# Patient Record
Sex: FEMALE | Race: OTHER | HISPANIC OR LATINO
[De-identification: names, ages, dates, MRNs, and addresses within clinical notes are randomized per-mention and may not be internally consistent; named-entity substitution may affect disease eponyms.]

---

## 2020-11-03 PROBLEM — Z00.00 ENCOUNTER FOR PREVENTIVE HEALTH EXAMINATION: Status: ACTIVE | Noted: 2020-11-03

## 2020-11-04 ENCOUNTER — APPOINTMENT (OUTPATIENT)
Dept: OBGYN | Facility: CLINIC | Age: 30
End: 2020-11-04
Payer: MEDICAID

## 2020-11-04 ENCOUNTER — APPOINTMENT (OUTPATIENT)
Dept: ANTEPARTUM | Facility: CLINIC | Age: 30
End: 2020-11-04
Payer: MEDICAID

## 2020-11-04 VITALS
SYSTOLIC BLOOD PRESSURE: 130 MMHG | BODY MASS INDEX: 29.16 KG/M2 | HEIGHT: 65 IN | WEIGHT: 175 LBS | DIASTOLIC BLOOD PRESSURE: 80 MMHG

## 2020-11-04 PROCEDURE — 99202 OFFICE O/P NEW SF 15 MIN: CPT

## 2020-11-04 PROCEDURE — 76801 OB US < 14 WKS SINGLE FETUS: CPT

## 2020-11-04 PROCEDURE — 99072 ADDL SUPL MATRL&STAF TM PHE: CPT

## 2020-11-04 PROCEDURE — 99072 ADDL SUPL MATRL&STAF TM PHE: CPT | Mod: 59

## 2020-11-04 NOTE — HISTORY OF PRESENT ILLNESS
[FreeTextEntry1] : 31yo \par \par NVD*1\par TOP*1\par \par came for missed  diagnosed in ER last week\par Official US - IUP 6+0, no Heart beat. small subchorionic hemorrhage\par \par Blood type- O RH+ \par \par

## 2020-11-04 NOTE — PROCEDURE
[Intrauterine Pregnancy] : intrauterine pregnancy [Yolk Sac] : no yolk sac [Fetal Heart] : no fetal heart [CRL: ___ (mm)] : CRL - [unfilled]Umm [Current GA by Sonogram: ___ (wks)] : Current GA by Sonogram: [unfilled]Uwks [___ day(s)] : [unfilled] days [Transvaginal OB Sonogram WNL] : Transvaginal OB Sonogram WNL [FreeTextEntry1] : no FH

## 2020-11-05 ENCOUNTER — EMERGENCY (EMERGENCY)
Facility: HOSPITAL | Age: 30
LOS: 1 days | Discharge: HOME | End: 2020-11-05
Attending: EMERGENCY MEDICINE | Admitting: EMERGENCY MEDICINE
Payer: MEDICAID

## 2020-11-05 ENCOUNTER — RESULT REVIEW (OUTPATIENT)
Age: 30
End: 2020-11-05

## 2020-11-05 VITALS
DIASTOLIC BLOOD PRESSURE: 85 MMHG | RESPIRATION RATE: 18 BRPM | OXYGEN SATURATION: 99 % | SYSTOLIC BLOOD PRESSURE: 133 MMHG | HEART RATE: 106 BPM

## 2020-11-05 VITALS
DIASTOLIC BLOOD PRESSURE: 97 MMHG | OXYGEN SATURATION: 100 % | TEMPERATURE: 98 F | HEIGHT: 72 IN | RESPIRATION RATE: 18 BRPM | WEIGHT: 175.05 LBS | HEART RATE: 90 BPM | SYSTOLIC BLOOD PRESSURE: 155 MMHG

## 2020-11-05 DIAGNOSIS — O20.9 HEMORRHAGE IN EARLY PREGNANCY, UNSPECIFIED: ICD-10-CM

## 2020-11-05 DIAGNOSIS — Z88.2 ALLERGY STATUS TO SULFONAMIDES: ICD-10-CM

## 2020-11-05 DIAGNOSIS — Z91.010 ALLERGY TO PEANUTS: ICD-10-CM

## 2020-11-05 DIAGNOSIS — Z91.018 ALLERGY TO OTHER FOODS: ICD-10-CM

## 2020-11-05 DIAGNOSIS — Z3A.08 8 WEEKS GESTATION OF PREGNANCY: ICD-10-CM

## 2020-11-05 LAB
ALBUMIN SERPL ELPH-MCNC: 4.4 G/DL — SIGNIFICANT CHANGE UP (ref 3.3–5)
ALP SERPL-CCNC: 64 U/L — SIGNIFICANT CHANGE UP (ref 40–120)
ALT FLD-CCNC: 28 U/L — SIGNIFICANT CHANGE UP (ref 10–45)
ANION GAP SERPL CALC-SCNC: 11 MMOL/L — SIGNIFICANT CHANGE UP (ref 5–17)
APPEARANCE UR: CLEAR — SIGNIFICANT CHANGE UP
APTT BLD: 33.8 SEC — SIGNIFICANT CHANGE UP (ref 27.5–35.5)
AST SERPL-CCNC: 16 U/L — SIGNIFICANT CHANGE UP (ref 10–40)
BACTERIA # UR AUTO: PRESENT /HPF
BASOPHILS # BLD AUTO: 0.06 K/UL — SIGNIFICANT CHANGE UP (ref 0–0.2)
BASOPHILS NFR BLD AUTO: 0.6 % — SIGNIFICANT CHANGE UP (ref 0–2)
BILIRUB SERPL-MCNC: 0.5 MG/DL — SIGNIFICANT CHANGE UP (ref 0.2–1.2)
BILIRUB UR-MCNC: NEGATIVE — SIGNIFICANT CHANGE UP
BLD GP AB SCN SERPL QL: NEGATIVE — SIGNIFICANT CHANGE UP
BUN SERPL-MCNC: 6 MG/DL — LOW (ref 7–23)
CALCIUM SERPL-MCNC: 9.4 MG/DL — SIGNIFICANT CHANGE UP (ref 8.4–10.5)
CHLORIDE SERPL-SCNC: 104 MMOL/L — SIGNIFICANT CHANGE UP (ref 96–108)
CO2 SERPL-SCNC: 25 MMOL/L — SIGNIFICANT CHANGE UP (ref 22–31)
COLOR SPEC: YELLOW — SIGNIFICANT CHANGE UP
CREAT SERPL-MCNC: 0.77 MG/DL — SIGNIFICANT CHANGE UP (ref 0.5–1.3)
DIFF PNL FLD: ABNORMAL
EOSINOPHIL # BLD AUTO: 0.47 K/UL — SIGNIFICANT CHANGE UP (ref 0–0.5)
EOSINOPHIL NFR BLD AUTO: 4.8 % — SIGNIFICANT CHANGE UP (ref 0–6)
EPI CELLS # UR: ABNORMAL /HPF (ref 0–5)
GLUCOSE SERPL-MCNC: 115 MG/DL — HIGH (ref 70–99)
GLUCOSE UR QL: NEGATIVE — SIGNIFICANT CHANGE UP
HCG SERPL-ACNC: HIGH MIU/ML
HCG SERPL-MCNC: ABNORMAL MIU/ML
HCT VFR BLD CALC: 39.2 % — SIGNIFICANT CHANGE UP (ref 34.5–45)
HGB BLD-MCNC: 13.3 G/DL — SIGNIFICANT CHANGE UP (ref 11.5–15.5)
IMM GRANULOCYTES NFR BLD AUTO: 0.3 % — SIGNIFICANT CHANGE UP (ref 0–1.5)
INR BLD: 0.99 — SIGNIFICANT CHANGE UP (ref 0.88–1.16)
KETONES UR-MCNC: NEGATIVE — SIGNIFICANT CHANGE UP
LEUKOCYTE ESTERASE UR-ACNC: NEGATIVE — SIGNIFICANT CHANGE UP
LYMPHOCYTES # BLD AUTO: 2.57 K/UL — SIGNIFICANT CHANGE UP (ref 1–3.3)
LYMPHOCYTES # BLD AUTO: 26.1 % — SIGNIFICANT CHANGE UP (ref 13–44)
MCHC RBC-ENTMCNC: 27.8 PG — SIGNIFICANT CHANGE UP (ref 27–34)
MCHC RBC-ENTMCNC: 33.9 GM/DL — SIGNIFICANT CHANGE UP (ref 32–36)
MCV RBC AUTO: 81.8 FL — SIGNIFICANT CHANGE UP (ref 80–100)
MONOCYTES # BLD AUTO: 0.6 K/UL — SIGNIFICANT CHANGE UP (ref 0–0.9)
MONOCYTES NFR BLD AUTO: 6.1 % — SIGNIFICANT CHANGE UP (ref 2–14)
NEUTROPHILS # BLD AUTO: 6.1 K/UL — SIGNIFICANT CHANGE UP (ref 1.8–7.4)
NEUTROPHILS NFR BLD AUTO: 62.1 % — SIGNIFICANT CHANGE UP (ref 43–77)
NITRITE UR-MCNC: NEGATIVE — SIGNIFICANT CHANGE UP
NRBC # BLD: 0 /100 WBCS — SIGNIFICANT CHANGE UP (ref 0–0)
PH UR: 7 — SIGNIFICANT CHANGE UP (ref 5–8)
PLATELET # BLD AUTO: 268 K/UL — SIGNIFICANT CHANGE UP (ref 150–400)
POTASSIUM SERPL-MCNC: 4 MMOL/L — SIGNIFICANT CHANGE UP (ref 3.5–5.3)
POTASSIUM SERPL-SCNC: 4 MMOL/L — SIGNIFICANT CHANGE UP (ref 3.5–5.3)
PROGEST SERPL-MCNC: 4.6 NG/ML
PROT SERPL-MCNC: 7.3 G/DL — SIGNIFICANT CHANGE UP (ref 6–8.3)
PROT UR-MCNC: NEGATIVE MG/DL — SIGNIFICANT CHANGE UP
PROTHROM AB SERPL-ACNC: 11.9 SEC — SIGNIFICANT CHANGE UP (ref 10.6–13.6)
RBC # BLD: 4.79 M/UL — SIGNIFICANT CHANGE UP (ref 3.8–5.2)
RBC # FLD: 12.2 % — SIGNIFICANT CHANGE UP (ref 10.3–14.5)
RBC CASTS # UR COMP ASSIST: < 5 /HPF — SIGNIFICANT CHANGE UP
RH IG SCN BLD-IMP: POSITIVE — SIGNIFICANT CHANGE UP
SODIUM SERPL-SCNC: 140 MMOL/L — SIGNIFICANT CHANGE UP (ref 135–145)
SP GR SPEC: 1.01 — SIGNIFICANT CHANGE UP (ref 1–1.03)
UROBILINOGEN FLD QL: 0.2 E.U./DL — SIGNIFICANT CHANGE UP
WBC # BLD: 9.83 K/UL — SIGNIFICANT CHANGE UP (ref 3.8–10.5)
WBC # FLD AUTO: 9.83 K/UL — SIGNIFICANT CHANGE UP (ref 3.8–10.5)
WBC UR QL: < 5 /HPF — SIGNIFICANT CHANGE UP

## 2020-11-05 PROCEDURE — 85610 PROTHROMBIN TIME: CPT

## 2020-11-05 PROCEDURE — 76817 TRANSVAGINAL US OBSTETRIC: CPT

## 2020-11-05 PROCEDURE — 87086 URINE CULTURE/COLONY COUNT: CPT

## 2020-11-05 PROCEDURE — 76801 OB US < 14 WKS SINGLE FETUS: CPT

## 2020-11-05 PROCEDURE — 84702 CHORIONIC GONADOTROPIN TEST: CPT

## 2020-11-05 PROCEDURE — 76817 TRANSVAGINAL US OBSTETRIC: CPT | Mod: 26,59

## 2020-11-05 PROCEDURE — 85730 THROMBOPLASTIN TIME PARTIAL: CPT

## 2020-11-05 PROCEDURE — 85025 COMPLETE CBC W/AUTO DIFF WBC: CPT

## 2020-11-05 PROCEDURE — 81001 URINALYSIS AUTO W/SCOPE: CPT

## 2020-11-05 PROCEDURE — 80053 COMPREHEN METABOLIC PANEL: CPT

## 2020-11-05 PROCEDURE — 88305 TISSUE EXAM BY PATHOLOGIST: CPT

## 2020-11-05 PROCEDURE — 76801 OB US < 14 WKS SINGLE FETUS: CPT | Mod: 26

## 2020-11-05 PROCEDURE — 86901 BLOOD TYPING SEROLOGIC RH(D): CPT

## 2020-11-05 PROCEDURE — 99285 EMERGENCY DEPT VISIT HI MDM: CPT

## 2020-11-05 PROCEDURE — 99284 EMERGENCY DEPT VISIT MOD MDM: CPT

## 2020-11-05 PROCEDURE — 86850 RBC ANTIBODY SCREEN: CPT

## 2020-11-05 PROCEDURE — 36415 COLL VENOUS BLD VENIPUNCTURE: CPT

## 2020-11-05 PROCEDURE — 88305 TISSUE EXAM BY PATHOLOGIST: CPT | Mod: 26

## 2020-11-05 PROCEDURE — 86900 BLOOD TYPING SEROLOGIC ABO: CPT

## 2020-11-05 RX ORDER — ACETAMINOPHEN 500 MG
650 TABLET ORAL ONCE
Refills: 0 | Status: COMPLETED | OUTPATIENT
Start: 2020-11-05 | End: 2020-11-05

## 2020-11-05 RX ADMIN — Medication 650 MILLIGRAM(S): at 17:01

## 2020-11-05 NOTE — ED PROVIDER NOTE - CLINICAL SUMMARY MEDICAL DECISION MAKING FREE TEXT BOX
31 y/o F, , LMP , currently 8-9 weeks pregnant, presents to the ED w/ continued pelvic cramping and vaginal bleeding. Concerning for threatened ab vs ectopic. Will obtain labs and US. Pt otherwise well appearing, w/o peritoneal signs. 31 y/o F, , LMP , currently 8-9 weeks pregnant, presents to the ED w/ continued pelvic cramping and vaginal bleeding. Concerning for threatened ab vs ectopic vs pregnancy failure. Will obtain labs and US. Pt otherwise well appearing, w/o peritoneal signs. GYN consulted. Pt advised to follow-up with Dr. Juares in one week and given return precautions. I have discussed the discharge plan with the patient. The patient agrees with the plan, as discussed.  The patient understands Emergency Department diagnosis is a preliminary diagnosis often based on limited information and that the patient must adhere to the follow-up plan as discussed.  The patient understands that if the symptoms worsen or if prescribed medications do not have the desired/planned effect that the patient may return to the Emergency Department at any time for further evaluation and treatment. see MDM note below

## 2020-11-05 NOTE — ED PROVIDER NOTE - PATIENT PORTAL LINK FT
You can access the FollowMyHealth Patient Portal offered by [facility] by registering at the following website: [email or web address]. By joining [facility]’s FollowMyHealth portal, you will also be able to view your health information using other applications (apps) compatible with our system.

## 2020-11-05 NOTE — ED PROVIDER NOTE - OBJECTIVE STATEMENT
29 y/o F with no PMHx, , LMP , estimated gestational age of 8-9 weeks, obgyn is Dr. Juares, presents to the ED c/o having pelvic cramping and vaginal bleeding. Pt states her symptoms initially occurred last Saturday and at that time the bleeding was described as bright red spotting. The bleeding has since increased, and she is saturating 2 pads a day. States the pelvic cramping has also increased, and is located suprapubically. Pt went to [facility] on  and was advised she could be having a miscarriage. She then followed up with Dr. Juares yesterday and had an US done, but was told it was too early to visualize. Pt now here with continued pain and vaginal bleeding. Denies the following: fever, chills, chest pain, SOB, abdominal pain, back pain, urinary symptoms, any concern for STDs, syncope, or dizziness.

## 2020-11-05 NOTE — ED ADULT NURSE REASSESSMENT NOTE - NS ED NURSE REASSESS COMMENT FT1
pt started having heavier vaginal bleeding and passed a medium sized formed sac /clot content , sindi Maxwell informed , pt assessed , vital signes rechecked , OBGYN resident also informed ,  fetal content collected in specimen cup and taken by  Obgyn resident to take to lab , pt re evaluated and was discharged to follow up with Obgyn MD

## 2020-11-05 NOTE — ED ADULT TRIAGE NOTE - CHIEF COMPLAINT QUOTE
Pt presents as walk-in w/ c/o abdominal cramping and vaginal bleeding x few days. Pt states 8 weeks pregnant, had outpatient US done yesterday and was told to follow up in 1 week. Pt states this morning noticed the bleeding increased.

## 2020-11-05 NOTE — ED PROVIDER NOTE - ATTENDING CONTRIBUTION TO CARE
31 y/o female +preg, with vag bleed and pelvic cramping. US- IUP 6 weeks, 2 days. OB/GYN consulted and pt. to f/u in one week.  pt. hemodynamically stable

## 2020-11-05 NOTE — CONSULT NOTE ADULT - ASSESSMENT
A/P: 31yo  at 9w5d by LMP  who presents for vaginal cramping/pain for 5 days and vaginal bleeding for 1 day.   - VSS (first /97 though repeat 113/68), HR 85-90  - Hb 13.3, O pos blood type, rhogam not indicated, bhcg 13,894 (previously 21,661 at outside hospital on )  - Abdominal exam benign, scant dark brown blood in vault, no active bleeding appreciated  - TVUS shows single IUP with CRL measuring 6w2d, no FHR; findings suggestive of but not diagnostic of pregnancy failure  - Given patient is clinically and hemodynamically stable, no acute gyn intervention indicated at this time  - Sonogram showing single IUP measuring 6wks GA with no FHR and also with decreasing bhcg c/w possible early pregnancy failure though is not diagnostic; recommend out-patient f/u in 1 week  - Patient to return to ED sooner for heavy vaginal bleeding, severe/worsening pain, or fever/chills    Patient seen by Елена Mora PGY3   d/w Dr Lu A/P: 29yo  at 9w5d by LMP  who presents for vaginal cramping/pain for 5 days and vaginal bleeding for 1 day.   - VSS (first /97 though repeat 113/68), HR 85-90; repeat VS prior to discharge  - Hb 13.3, O pos blood type, rhogam not indicated, bhcg 13,894 (previously 21,661 at outside hospital on )  - Abdominal exam benign, scant dark brown blood in vault, no active bleeding appreciated  - TVUS shows single IUP with CRL measuring 6w2d, no FHR; findings suggestive of but not diagnostic of pregnancy failure (sonogram c/w  [facility] ultrasound performed no )  - Given patient is clinically and hemodynamically stable, no acute gyn intervention indicated at this time  - Sonogram showing single IUP measuring 6wks GA with no FHR and also with decreasing bhcg c/w possible early pregnancy failure though is not diagnostic; recommend out-patient f/u in 1 week  - Patient to return to ED sooner for heavy vaginal bleeding, severe/worsening pain, or fever/chills    Patient seen by Елена Mora PGY3   d/w Dr Lu A/P: 29yo  at 9w5d by LMP  who presents for vaginal cramping/pain for 5 days and vaginal bleeding for 1 day.   - VSS (first /97 though repeat 113/68), HR 85-90; repeat VS prior to discharge  - Hb 13.3, O pos blood type, rhogam not indicated, bhcg 13,894 (previously 21,661 at outside hospital on )  - Abdominal exam benign, scant dark brown blood in vault, no active bleeding appreciated  - TVUS shows single IUP with CRL measuring 6w2d, no FHR; findings suggestive of but not diagnostic of pregnancy failure (sonogram c/w  [facility] ultrasound performed no )  - Given patient is clinically and hemodynamically stable, no acute gyn intervention indicated at this time  - Sonogram showing single IUP measuring 6wks GA with no FHR and also with decreasing bhcg c/w possible early pregnancy failure though is not diagnostic; recommend out-patient f/u in 1 week  - Patient to return to ED sooner for heavy vaginal bleeding, severe/worsening pain, or fever/chills    Patient seen by Елена Mora PGY3   d/w Dr Lu      **Addendum at 19:30**  I was notified by the ED PA that while patient was waiting for her ride home she had an episode of heavy vaginal bleeding and passed what she thinks was tissue. Suspected tissue collected in specimen cup. I assessed the suspected tissue and it appeared to be consistent with products of conception. Patient requested that specimen be sent to pathology to confirm that it is indeed products. Specimen brought up to pathology by myself.    d/w Dr Lu

## 2020-11-05 NOTE — ED PROVIDER NOTE - NSFOLLOWUPINSTRUCTIONS_ED_ALL_ED_FT
Please follow up with Dr. Juares in one week. Return to the Emergency Department if you have any new or worsening symptoms, or if you have any concerns.    Please follow up with [facility] following your ED visit.     [address] [place] [id number]  #[phone]  Hours: Monday - Wednesday, 9am-3pm    PLEASE CALL TO MAKE AN APPOINTMENT PRIOR TO ARRIVAL.        Vaginal Bleeding During Pregnancy, First Trimester       A small amount of bleeding from the vagina (spotting) is relatively common during early pregnancy. It usually stops on its own. Various things may cause bleeding or spotting during early pregnancy. Some bleeding may be related to the pregnancy, and some may not. In many cases, the bleeding is normal and is not a problem. However, bleeding can also be a sign of something serious. Be sure to tell your health care provider about any vaginal bleeding right away.  Some possible causes of vaginal bleeding during the first trimester include:  •Infection or inflammation of the cervix.      •Growths (polyps) on the cervix.      •Miscarriage or threatened miscarriage.      •Pregnancy tissue developing outside of the uterus (ectopic pregnancy).      •A mass of tissue developing in the uterus due to an egg being fertilized incorrectly (molar pregnancy).        Follow these instructions at home:    Activity     •Follow instructions from your health care provider about limiting your activity. Ask what activities are safe for you.      •If needed, make plans for someone to help with your regular activities.      • Do not have sex or orgasms until your health care provider says that this is safe.      General instructions     •Take over-the-counter and prescription medicines only as told by your health care provider.      •Pay attention to any changes in your symptoms.      • Do not use tampons or douche.      •Write down how many pads you use each day, how often you change pads, and how soaked (saturated) they are.      •If you pass any tissue from your vagina, save the tissue so you can show it to your health care provider.      •Keep all follow-up visits as told by your health care provider. This is important.        Contact a health care provider if:    •You have vaginal bleeding during any part of your pregnancy.      •You have cramps or labor pains.      •You have a fever.        Get help right away if:    •You have severe cramps in your back or abdomen.      •You pass large clots or a large amount of tissue from your vagina.      •Your bleeding increases.      •You feel light-headed or weak, or you faint.      •You have chills.      •You are leaking fluid or have a gush of fluid from your vagina.        Summary    •A small amount of bleeding (spotting) from the vagina is relatively common during early pregnancy.      •Various things may cause bleeding or spotting in early pregnancy.      •Be sure to tell your health care provider about any vaginal bleeding right away.      This information is not intended to replace advice given to you by your health care provider. Make sure you discuss any questions you have with your health care provider.      Document Revised: 04/07/2020 Document Reviewed: 03/22/2018    Elsevier Patient Education © 2020 [employer]

## 2020-11-05 NOTE — ED ADULT NURSE NOTE - OBJECTIVE STATEMENT
pt is 8 weeks pregnant , c/o light vaginal bleeding and pelvic cramping x  a few days and had an ultrasound yesterday, vaginal bleeding was heavier this am

## 2020-11-05 NOTE — ED PROVIDER NOTE - CHPI ED SYMPTOMS NEG
no chills/no fever/no chest pain, no SOB, no urinary symptoms, no concern for STDs, no syncope, no dizziness/no abdominal pain/no back pain

## 2020-11-05 NOTE — ED PROVIDER NOTE - NS ED ROS FT
General: no fever, chills, confusion  Cardiac: no chest pain, chest tightness, palpitations  Lungs: no sob, difficulty breathing  Abdomen: no abdominal pain, nausea, vomiting, diarrhea, constipation, nml BM  : + pelvic cramping, vaginal bleeding  no dysuria, urinary frequency/urgency, no concern for STDs    All other systems negative except as per HPI

## 2020-11-05 NOTE — ED ADULT TRIAGE NOTE - BP NONINVASIVE DIASTOLIC (MM HG)
12/3/2018    Name:Geni Salazar    MR#:[id number]     PROGRESS NOTE:  Post-Op Day 1 S/P    HD:2    Subjective   20 y.o. yo Female  s/p CS at 40w4d doing well. Pain well controlled. Tolerating regular diet and having flatus. Lochia normal. Denies dizziness, weakness, shortness of breath.     Patient Active Problem List   Diagnosis   • Post-dates pregnancy        Objective    Vitals  Temp:  Temp:  [98 °F (36.7 °C)-98.7 °F (37.1 °C)] 98.3 °F (36.8 °C)  Temp src: Oral  BP:  BP: (114-135)/(67-84) 121/80  Pulse:  Heart Rate:  [108-121] 109  RR:   Resp:  [16-22] 18    General Awake, alert, no distress  Abdomen Soft, non-distended, fundus firm, below umbilicus, appropriately tender  Incision  Staples Intact, no erythema or exudate  Extremities Calves NT bilaterally     I/O last 3 completed shifts:  In: 1900 [I.V.:1900]  Out: 5150 [Urine:4450; Blood:700]    LABS:   Lab Results   Component Value Date    WBC 12.54 2018    HGB 7.9 (L) 2018    HCT 25.5 (L) 2018    MCV 73.9 (L) 2018     2018       Infant: female       Assessment   1.  POD 1 primary C/S   2.  Anemia, stable, asymptomatic. Start po iron.     Plan: Doing well.  Routine postoperative care . Advance.      Active Problems:   None      WEN Busby  12/3/2018 9:12 AM  
  2018    Name:Geni Salazar    MR#:[id number]     PROGRESS NOTE:  Post-Op 2 S/P    HD:3    Subjective   20 y.o. yo Female  s/p CS at 40w4d doing well. Pain well controlled. Tolerating regular diet and having flatus. Lochia normal.   She denies sob and dizziness.    Patient Active Problem List   Diagnosis   • Post-dates pregnancy        Objective    Vitals  Temp:  Temp:  [97.7 °F (36.5 °C)-98.8 °F (37.1 °C)] 97.7 °F (36.5 °C)  Temp src: Oral  BP:  BP: (119-136)/(74-88) 129/84  Pulse:  Heart Rate:  [104-113] 112  RR:   Resp:  [18] 18    General Awake, alert, no distress  Abdomen Soft, non-distended, fundus firm, below umbilicus, appropriately tender  Incision  Intact, no erythema or exudate  Extremities Calves NT bilaterally     I/O last 3 completed shifts:  In: -   Out: 1450 [Urine:1450]    LABS:   Lab Results   Component Value Date    WBC 12.54 2018    HGB 7.9 (L) 2018    HCT 25.5 (L) 2018    MCV 73.9 (L) 2018     2018       Infant: female       Assessment   1.  POD 2  Asymptomatic anemia    Plan: Doing well.  Routine postoperative care.  Continue iron.  Plan dc wed.      Active Problems:   None      Coco Keller, WEN  2018 8:34 AM  
Doing well post op  
Slow progress ,     5-6/90/-3 station    FHT reactive with occ variable decels    A/P if no progress over the next hour we will proceed with a primary     Reviewed with pt and   
97

## 2020-11-05 NOTE — CONSULT NOTE ADULT - SUBJECTIVE AND OBJECTIVE BOX
31yo  at 9w5d by LMP  who presents for vaginal cramping/pain for 5 days and vaginal bleeding for 1 day. She says her pelvic cramping began on Saturday 10/31 then on  she had some brown vaginal spotting. She then went to [facility] where she was found to have a bhcg of 21,661 and on sonogram was found to have a single IUP with CRL c/w 6w0d GA with yolk sac though no fetal heartbeat and also with a small subchorionic hemorrhage. She was told that she likely had a "fetal demise" and to follow up out-patient. She then went to an ObGyn to establish prenatal care and says she had a ultrasound done which showed an IUP c/w 6w2d GA and again no FHR so was sent for an official ultrasound and told to follow up in 1 week. Last night she began having heavier vaginal bleeding about amount of a moderate period so she came to the ED today. She says her bleeding is now some dark brown spotting and cramping has resolved with Tylenol in the ED. She denies nausea/vomiting, dizziness, headache, or fevers. This is a desired pregnancy,    ObHx:  G1 2008 VTOP D&C  G2 2009 VTOP D&C  G3 2015 , uncomplicated  G4 current  GynHx: h/o abnormal pap smears, last pap smear 2017 wnl; prenatal care with Dr Maldonado with first appointment yesterday   PMH: denies  PSH: D&C x2  Allergic to sulfa drugs (unk childhood reaction)  Meds: denies  Social: denies T/E/D    Vital Signs Last 24 Hrs  T(C): 36.7 (2020 10:47), Max: 36.7 (2020 10:47)  T(F): 98 (2020 10:47), Max: 98 (2020 10:47)  HR: 85 (2020 13:54) (85 - 90)  BP: 113/68 (2020 13:54) (113/68 - 155/97)  BP(mean): --  RR: 18 (2020 13:54) (18 - 18)  SpO2: 98% (2020 13:54) (98% - 100%)    Physical Exam:  Gen: NAD  GI: soft, minimally tender in LLQ on deep palpation, nondistended + BS, no rebound/ guarding  BME: normal anteverted uterus, no adnexal masses appreciated    Spec: cervical os visually closed, scant dark brown spotting in vault, no active bleeding appreciated   Ext: wnl      LABS:                        13.3   9.83  )-----------( 268      ( 2020 11:39 )             39.2     11    140  |  104  |  6<L>  ----------------------------<  115<H>  4.0   |  25  |  0.77    Ca    9.4      2020 11:39    TPro  7.3  /  Alb  4.4  /  TBili  0.5  /  DBili  x   /  AST  16  /  ALT  28  /  AlkPhos  64  11-05    PT/INR - ( 2020 11:39 )   PT: 11.9 sec;   INR: 0.99          PTT - ( 2020 11:39 )  PTT:33.8 sec  Urinalysis Basic - ( 2020 11:39 )    Color: Yellow / Appearance: Clear / S.015 / pH: x  Gluc: x / Ketone: NEGATIVE  / Bili: Negative / Urobili: 0.2 E.U./dL   Blood: x / Protein: NEGATIVE mg/dL / Nitrite: NEGATIVE   Leuk Esterase: NEGATIVE / RBC: < 5 /HPF / WBC < 5 /HPF   Sq Epi: x / Non Sq Epi: 5-10 /HPF / Bacteria: Present /HPF        RADIOLOGY & ADDITIONAL STUDIES:    EXAM:  US OB TRANSVAGINAL                          EXAM:  US OB LES THAN 14 WKS 1ST GEST                          PROCEDURE DATE:  2020          INTERPRETATION:  CLINICAL INFORMATION: Vaginal bleeding LMP: 2020 Estimated Gestational Age by LMP: 9 weeks 5 days  BHC,849    Endovaginal and transabdominal pelvic sonogram. Color and Spectral Doppler was performed.    COMPARISON: None available.    FINDINGS:    Uterus: 10.3 x 5.7 x 7.3    Gestational Sac Size (mean): 14 mm  Crown Rump Length: 4 mm  Estimated Gestational Age: 6 weeks 2 days  Yolk Sac: 0.4 cm.  Fetal Heart Rate: Not detected.    Right ovary: 3.7 x 2.6 x 2.6 cm. inclusive of corpus luteum measuring 1.8 x 1.5 x 1.5 cm.    Left ovary: 2.8 x 1.3 x 2.3 cm cm. Within normal limits.    Fluid: Trace.    IMPRESSION:    Single intrauterine gestation with average ultrasound age of 6 weeks 2 days. No fetal heart activity detected. Findings suspicious for but not diagnostic of pregnancy failure. Recommend follow-up serial beta hCG and follow-up ultrasound in 5-7 days.    Dr. Russo discussed findings with Benito HARVEY 2020 1:46 PM.          Thank you for the opportunity to participate in the care of this patient.      TRU RUSSO MD, ATTENDING RADIOLOGIST  This document has been electronically signed. 2020  1:50PM   31yo  at 9w5d by LMP  who presents for vaginal cramping/pain for 5 days and vaginal bleeding for 1 day. She says her pelvic cramping began on Saturday 10/31 then on  she had some brown vaginal spotting. She then went to [facility] where she was found to have a bhcg of 21,661 and on sonogram was found to have a single IUP with CRL c/w 6w0d GA with yolk sac though no fetal heartbeat and also with a small subchorionic hemorrhage. She was told that she likely had a "fetal demise" and to follow up out-patient. She then went to an ObGyn to establish prenatal care and says she had a ultrasound done which showed an IUP c/w 6w2d GA and again no FHR so was sent for an official ultrasound and told to follow up in 1 week. Last night she began having heavier vaginal bleeding about amount of a moderate period so she came to the ED today. She says her bleeding is now some dark brown spotting and cramping has resolved with Tylenol in the ED. She denies nausea/vomiting, dizziness, headache, or fevers. This is a desired pregnancy,    ObHx:  G1 2008 VTOP D&C  G2 2009 VTOP D&C  G3 2015 , uncomplicated  G4 current  GynHx: h/o abnormal pap smears, last pap smear 2017 wnl; prenatal care with Dr Maldonado with first appointment yesterday   PMH: denies  PSH: D&C x2  Allergic to sulfa drugs (unk childhood reaction)  Meds: denies  Social: denies T/E/D    Vital Signs Last 24 Hrs  T(C): 36.7 (2020 10:47), Max: 36.7 (2020 10:47)  T(F): 98 (2020 10:47), Max: 98 (2020 10:47)  HR: 85 (2020 13:54) (85 - 90)  BP: 113/68 (2020 13:54) (113/68 - 155/97)  BP(mean): --  RR: 18 (2020 13:54) (18 - 18)  SpO2: 98% (2020 13:54) (98% - 100%)    Physical Exam:  Gen: NAD  GI: soft, minimally tender in LLQ on deep palpation, nondistended + BS, no rebound/ guarding  BME: normal anteverted uterus, no adnexal masses appreciated    Spec: cervical os visually closed, scant dark brown spotting in vault, no active bleeding appreciated   Ext: wnl      LABS:                        13.3   9.83  )-----------( 268      ( 2020 11:39 )             39.2     11    140  |  104  |  6<L>  ----------------------------<  115<H>  4.0   |  25  |  0.77    Ca    9.4      2020 11:39    TPro  7.3  /  Alb  4.4  /  TBili  0.5  /  DBili  x   /  AST  16  /  ALT  28  /  AlkPhos  64  11-05    PT/INR - ( 2020 11:39 )   PT: 11.9 sec;   INR: 0.99          PTT - ( 2020 11:39 )  PTT:33.8 sec  Urinalysis Basic - ( 2020 11:39 )    Color: Yellow / Appearance: Clear / S.015 / pH: x  Gluc: x / Ketone: NEGATIVE  / Bili: Negative / Urobili: 0.2 E.U./dL   Blood: x / Protein: NEGATIVE mg/dL / Nitrite: NEGATIVE   Leuk Esterase: NEGATIVE / RBC: < 5 /HPF / WBC < 5 /HPF   Sq Epi: x / Non Sq Epi: 5-10 /HPF / Bacteria: Present /HPF    [facility] ultrasound :  intrauterine gestation is noted with fetal pole and yolk sac, GS 20.3mm c/w 7w2d, CRL 4.8mm c/w 6w1d. Cardiac activity was not seen. Maternal ovaries appear normal. Findings reviewed with patient. Early normal pregnancy versus early pregnancy loss. bleeding precautions reviewed and follow up in 1 week suggested.      RADIOLOGY & ADDITIONAL STUDIES:    EXAM:  US OB TRANSVAGINAL                          EXAM:  US OB LES THAN 14 WKS 1ST GEST                          PROCEDURE DATE:  2020          INTERPRETATION:  CLINICAL INFORMATION: Vaginal bleeding LMP: 2020 Estimated Gestational Age by LMP: 9 weeks 5 days  BHC,849    Endovaginal and transabdominal pelvic sonogram. Color and Spectral Doppler was performed.    COMPARISON: None available.    FINDINGS:    Uterus: 10.3 x 5.7 x 7.3    Gestational Sac Size (mean): 14 mm  Crown Rump Length: 4 mm  Estimated Gestational Age: 6 weeks 2 days  Yolk Sac: 0.4 cm.  Fetal Heart Rate: Not detected.    Right ovary: 3.7 x 2.6 x 2.6 cm. inclusive of corpus luteum measuring 1.8 x 1.5 x 1.5 cm.    Left ovary: 2.8 x 1.3 x 2.3 cm cm. Within normal limits.    Fluid: Trace.    IMPRESSION:    Single intrauterine gestation with average ultrasound age of 6 weeks 2 days. No fetal heart activity detected. Findings suspicious for but not diagnostic of pregnancy failure. Recommend follow-up serial beta hCG and follow-up ultrasound in 5-7 days.    Dr. Russo discussed findings with Benito HARVEY 2020 1:46 PM.          Thank you for the opportunity to participate in the care of this patient.      TRU RUSSO MD, ATTENDING RADIOLOGIST  This document has been electronically signed. 2020  1:50PM   Cardiac

## 2020-11-06 LAB
ABO + RH PNL BLD: NORMAL
BLD GP AB SCN SERPL QL: NORMAL
CULTURE RESULTS: SIGNIFICANT CHANGE UP
SPECIMEN SOURCE: SIGNIFICANT CHANGE UP

## 2020-11-10 LAB — SURGICAL PATHOLOGY STUDY: SIGNIFICANT CHANGE UP

## 2020-11-11 ENCOUNTER — APPOINTMENT (OUTPATIENT)
Dept: ANTEPARTUM | Facility: CLINIC | Age: 30
End: 2020-11-11

## 2020-11-11 ENCOUNTER — APPOINTMENT (OUTPATIENT)
Dept: OBGYN | Facility: CLINIC | Age: 30
End: 2020-11-11
Payer: MEDICAID

## 2020-11-11 DIAGNOSIS — O03.9 COMPLETE OR UNSPECIFIED SPONTANEOUS ABORTION W/OUT COMPLICATION: ICD-10-CM

## 2020-11-11 PROCEDURE — 99072 ADDL SUPL MATRL&STAF TM PHE: CPT

## 2020-11-11 PROCEDURE — 99213 OFFICE O/P EST LOW 20 MIN: CPT | Mod: TH

## 2020-11-11 NOTE — PROCEDURE
[Transvaginal OB Sonogram] : Transvaginal OB Sonogram [Intrauterine Pregnancy] : no intrauterine pregnancy [Yolk Sac] : no yolk sac [Fetal Heart] : no fetal heart [Transvaginal OB Sonogram WNL] : Transvaginal OB Sonogram - abnormalities noted [FreeTextEntry1] : complete spontaneous

## 2020-11-11 NOTE — HISTORY OF PRESENT ILLNESS
[TextBox_4] : 31 yo pt presents for follow up of SAB which was diagnosed in the ED on 11/5/2020. Denies f/c, n/v. Reports she is still bleeding and changing her pad 3 times daily.

## 2020-11-11 NOTE — PHYSICAL EXAM
[Labia Majora] : normal [Labia Minora] : normal [Moderate] : There was moderate vaginal bleeding [Normal] : normal [Uterine Adnexae] : normal

## 2022-06-23 ENCOUNTER — NON-APPOINTMENT (OUTPATIENT)
Age: 32
End: 2022-06-23

## 2022-06-23 PROBLEM — Z78.9 OTHER SPECIFIED HEALTH STATUS: Chronic | Status: ACTIVE | Noted: 2020-11-05

## 2022-07-12 ENCOUNTER — APPOINTMENT (OUTPATIENT)
Dept: OBGYN | Facility: CLINIC | Age: 32
End: 2022-07-12

## 2022-07-12 VITALS
DIASTOLIC BLOOD PRESSURE: 70 MMHG | SYSTOLIC BLOOD PRESSURE: 110 MMHG | BODY MASS INDEX: 29.82 KG/M2 | WEIGHT: 179 LBS | HEIGHT: 65 IN | OXYGEN SATURATION: 97 %

## 2022-07-12 PROCEDURE — 76805 OB US >/= 14 WKS SNGL FETUS: CPT

## 2022-07-12 PROCEDURE — 99212 OFFICE O/P EST SF 10 MIN: CPT | Mod: TH

## 2022-07-13 LAB
ALBUMIN SERPL ELPH-MCNC: 4.2 G/DL
ALP BLD-CCNC: 63 U/L
ALT SERPL-CCNC: 12 U/L
ANION GAP SERPL CALC-SCNC: 14 MMOL/L
AST SERPL-CCNC: 16 U/L
BASOPHILS # BLD AUTO: 0.06 K/UL
BASOPHILS NFR BLD AUTO: 0.5 %
BILIRUB SERPL-MCNC: 0.3 MG/DL
BUN SERPL-MCNC: 8 MG/DL
C TRACH RRNA SPEC QL NAA+PROBE: NOT DETECTED
CALCIUM SERPL-MCNC: 9.5 MG/DL
CHLORIDE SERPL-SCNC: 101 MMOL/L
CMV IGG SERPL QL: <0.2 U/ML
CMV IGG SERPL-IMP: NEGATIVE
CMV IGM SERPL QL: <8 AU/ML
CMV IGM SERPL QL: NEGATIVE
CO2 SERPL-SCNC: 21 MMOL/L
CREAT SERPL-MCNC: 0.75 MG/DL
CREAT SPEC-SCNC: 88 MG/DL
CREAT/PROT UR: 0.1 RATIO
EGFR: 108 ML/MIN/1.73M2
EOSINOPHIL # BLD AUTO: 0.25 K/UL
EOSINOPHIL NFR BLD AUTO: 2.2 %
ESTIMATED AVERAGE GLUCOSE: 103 MG/DL
GLUCOSE SERPL-MCNC: 78 MG/DL
HBA1C MFR BLD HPLC: 5.2 %
HBV SURFACE AG SER QL: NONREACTIVE
HCT VFR BLD CALC: 37.3 %
HCV AB SER QL: NONREACTIVE
HCV S/CO RATIO: 0.09 S/CO
HGB BLD-MCNC: 12.9 G/DL
HIV1+2 AB SPEC QL IA.RAPID: NONREACTIVE
IMM GRANULOCYTES NFR BLD AUTO: 0.3 %
LYMPHOCYTES # BLD AUTO: 2.14 K/UL
LYMPHOCYTES NFR BLD AUTO: 18.7 %
MAN DIFF?: NORMAL
MCHC RBC-ENTMCNC: 29.2 PG
MCHC RBC-ENTMCNC: 34.6 GM/DL
MCV RBC AUTO: 84.4 FL
MEV IGG FLD QL IA: >300 AU/ML
MEV IGG+IGM SER-IMP: POSITIVE
MONOCYTES # BLD AUTO: 0.67 K/UL
MONOCYTES NFR BLD AUTO: 5.9 %
MUV AB SER-ACNC: POSITIVE
MUV IGG SER QL IA: >300 AU/ML
N GONORRHOEA RRNA SPEC QL NAA+PROBE: NOT DETECTED
NEUTROPHILS # BLD AUTO: 8.29 K/UL
NEUTROPHILS NFR BLD AUTO: 72.4 %
PLATELET # BLD AUTO: 264 K/UL
POTASSIUM SERPL-SCNC: 4.3 MMOL/L
PROT SERPL-MCNC: 6.8 G/DL
PROT UR-MCNC: 8 MG/DL
RBC # BLD: 4.42 M/UL
RBC # FLD: 13.8 %
RUBV IGG FLD-ACNC: 10.5 INDEX
RUBV IGG SER-IMP: POSITIVE
SODIUM SERPL-SCNC: 136 MMOL/L
SOURCE AMPLIFICATION: NORMAL
T GONDII AB SER-IMP: NEGATIVE
T GONDII AB SER-IMP: NEGATIVE
T GONDII IGG SER QL: <3 IU/ML
T GONDII IGM SER QL: <3 AU/ML
T PALLIDUM AB SER QL IA: NEGATIVE
TSH SERPL-ACNC: 1.89 UIU/ML
VZV AB TITR SER: POSITIVE
VZV IGG SER IF-ACNC: 2083 INDEX
WBC # FLD AUTO: 11.44 K/UL

## 2022-07-18 ENCOUNTER — NON-APPOINTMENT (OUTPATIENT)
Age: 32
End: 2022-07-18

## 2022-07-18 LAB
ABO + RH PNL BLD: NORMAL
AR GENE MUT ANL BLD/T: NORMAL
B19V IGG SER QL IA: 2.13 INDEX
B19V IGG+IGM SER-IMP: NORMAL
B19V IGG+IGM SER-IMP: POSITIVE
B19V IGM FLD-ACNC: 0.13 INDEX
B19V IGM SER-ACNC: NEGATIVE
BACTERIA UR CULT: NORMAL
BLD GP AB SCN SERPL QL: NORMAL
CANDIDA VAG CYTO: NOT DETECTED
G VAGINALIS+PREV SP MTYP VAG QL MICRO: NOT DETECTED
HGB A MFR BLD: 55.1 %
HGB A2 MFR BLD: 3.2 %
HGB FRACT BLD-IMP: NORMAL
HGB S BLD QL SOLY: POSITIVE
HGB S MFR BLD: 41.7 %
T VAGINALIS VAG QL WET PREP: NOT DETECTED

## 2022-07-25 ENCOUNTER — APPOINTMENT (OUTPATIENT)
Dept: ANTEPARTUM | Facility: CLINIC | Age: 32
End: 2022-07-25

## 2022-07-25 ENCOUNTER — ASOB RESULT (OUTPATIENT)
Age: 32
End: 2022-07-25

## 2022-07-25 LAB
CFTR MUT TESTED BLD/T: NEGATIVE
FMR1 GENE MUT ANL BLD/T: NORMAL

## 2022-07-25 PROCEDURE — 76817 TRANSVAGINAL US OBSTETRIC: CPT | Mod: 59

## 2022-07-25 PROCEDURE — 76805 OB US >/= 14 WKS SNGL FETUS: CPT

## 2022-07-26 LAB
CREAT 24H UR-MCNC: 1.7 G/24 H
CREAT ?TM UR-MCNC: 70 MG/DL
PROT 24H UR-MRATE: 12 MG/DL
PROT ?TM UR-MCNC: 24 HR
PROT UR-MCNC: 288 MG/24 H
SPECIMEN VOL 24H UR: 2400 ML

## 2022-08-10 ENCOUNTER — NON-APPOINTMENT (OUTPATIENT)
Age: 32
End: 2022-08-10

## 2022-08-11 ENCOUNTER — APPOINTMENT (OUTPATIENT)
Dept: OBGYN | Facility: CLINIC | Age: 32
End: 2022-08-11

## 2022-08-11 VITALS
DIASTOLIC BLOOD PRESSURE: 70 MMHG | WEIGHT: 178 LBS | BODY MASS INDEX: 29.66 KG/M2 | HEIGHT: 65 IN | SYSTOLIC BLOOD PRESSURE: 120 MMHG | OXYGEN SATURATION: 97 %

## 2022-08-11 PROCEDURE — 81002 URINALYSIS NONAUTO W/O SCOPE: CPT

## 2022-08-11 PROCEDURE — 99213 OFFICE O/P EST LOW 20 MIN: CPT | Mod: TH

## 2022-08-17 ENCOUNTER — TRANSCRIPTION ENCOUNTER (OUTPATIENT)
Age: 32
End: 2022-08-17

## 2022-08-19 LAB — AFP PNL SERPL: NORMAL

## 2022-08-22 ENCOUNTER — ASOB RESULT (OUTPATIENT)
Age: 32
End: 2022-08-22

## 2022-08-22 ENCOUNTER — APPOINTMENT (OUTPATIENT)
Dept: ANTEPARTUM | Facility: CLINIC | Age: 32
End: 2022-08-22

## 2022-08-22 PROCEDURE — 76816 OB US FOLLOW-UP PER FETUS: CPT

## 2022-09-06 NOTE — HISTORY OF PRESENT ILLNESS
[Patient reported PAP Smear was normal] : Patient reported PAP Smear was normal [Y] : Positive pregnancy history [Normal Amount/Duration] :  normal amount and duration [Menarche Age: ____] : age at menarche was [unfilled] [No] : Patient does not have concerns regarding sex [Currently Active] : currently active [PapSmeardate] : 6/2022 [LMPDate] : 3/30/2022 [MensesFreq] : 28 [MensesLength] : 5-6 [PGHxTotal] : 2 [[facility] : [facility] [FreeTextEntry1] : 3/30/2022

## 2022-09-06 NOTE — PLAN
[FreeTextEntry1] : 31yo  LMP 3/30 14.6\par \par -confirmed IUP dating 15+2 wks on U/S, will follow LMP\par - Prenatal labs drawn\par -Pt late for nuchal NIPT drawn--> F/u\par -Pt to have FOB come next visit for genetic testing\par -counseled on ASA 162mg qd\par -counseled on unisom+vitB6\par -RTO- 4wks\par -given referral for BH early anatomy scan and late anatomy scan

## 2022-09-06 NOTE — PROCEDURE
[Transabdominal OB Sonogram] : Transabdominal OB Sonogram [Intrauterine Pregnancy] : intrauterine pregnancy [Transabdominal OB Sonogram WNL] : Transabdominal OB Sonogram WNL [FreeTextEntry1] : Dating 15+2\par u/s scanned in chart\par  BPM

## 2022-09-07 ENCOUNTER — NON-APPOINTMENT (OUTPATIENT)
Age: 32
End: 2022-09-07

## 2022-09-07 ENCOUNTER — APPOINTMENT (OUTPATIENT)
Dept: OBGYN | Facility: CLINIC | Age: 32
End: 2022-09-07

## 2022-09-07 VITALS
BODY MASS INDEX: 31.32 KG/M2 | WEIGHT: 188 LBS | OXYGEN SATURATION: 97 % | SYSTOLIC BLOOD PRESSURE: 100 MMHG | DIASTOLIC BLOOD PRESSURE: 62 MMHG | HEIGHT: 65 IN

## 2022-09-07 PROCEDURE — 81002 URINALYSIS NONAUTO W/O SCOPE: CPT

## 2022-09-07 PROCEDURE — 99213 OFFICE O/P EST LOW 20 MIN: CPT | Mod: TH

## 2022-10-06 ENCOUNTER — APPOINTMENT (OUTPATIENT)
Dept: OBGYN | Facility: CLINIC | Age: 32
End: 2022-10-06

## 2022-10-06 VITALS
WEIGHT: 196 LBS | BODY MASS INDEX: 32.62 KG/M2 | OXYGEN SATURATION: 97 % | SYSTOLIC BLOOD PRESSURE: 125 MMHG | DIASTOLIC BLOOD PRESSURE: 80 MMHG

## 2022-10-06 PROCEDURE — 99213 OFFICE O/P EST LOW 20 MIN: CPT | Mod: TH

## 2022-10-07 ENCOUNTER — TRANSCRIPTION ENCOUNTER (OUTPATIENT)
Age: 32
End: 2022-10-07

## 2022-10-07 LAB
BASOPHILS # BLD AUTO: 0.06 K/UL
BASOPHILS NFR BLD AUTO: 0.5 %
EOSINOPHIL # BLD AUTO: 0.28 K/UL
EOSINOPHIL NFR BLD AUTO: 2.4 %
GLUCOSE 1H P 100 G GLC PO SERPL-MCNC: 109 MG/DL
HCT VFR BLD CALC: 35.5 %
HGB BLD-MCNC: 11.9 G/DL
IMM GRANULOCYTES NFR BLD AUTO: 0.5 %
LYMPHOCYTES # BLD AUTO: 2.09 K/UL
LYMPHOCYTES NFR BLD AUTO: 17.9 %
MAN DIFF?: NORMAL
MCHC RBC-ENTMCNC: 28.3 PG
MCHC RBC-ENTMCNC: 33.5 GM/DL
MCV RBC AUTO: 84.3 FL
MONOCYTES # BLD AUTO: 0.67 K/UL
MONOCYTES NFR BLD AUTO: 5.7 %
NEUTROPHILS # BLD AUTO: 8.52 K/UL
NEUTROPHILS NFR BLD AUTO: 73 %
PLATELET # BLD AUTO: 224 K/UL
RBC # BLD: 4.21 M/UL
RBC # FLD: 12.8 %
T PALLIDUM AB SER QL IA: NEGATIVE
WBC # FLD AUTO: 11.68 K/UL

## 2022-10-27 ENCOUNTER — NON-APPOINTMENT (OUTPATIENT)
Age: 32
End: 2022-10-27

## 2022-10-27 ENCOUNTER — APPOINTMENT (OUTPATIENT)
Dept: OBGYN | Facility: CLINIC | Age: 32
End: 2022-10-27

## 2022-10-27 VITALS
DIASTOLIC BLOOD PRESSURE: 80 MMHG | SYSTOLIC BLOOD PRESSURE: 120 MMHG | BODY MASS INDEX: 32.99 KG/M2 | WEIGHT: 198 LBS | OXYGEN SATURATION: 98 % | HEIGHT: 65 IN

## 2022-10-27 PROCEDURE — 99213 OFFICE O/P EST LOW 20 MIN: CPT | Mod: TH

## 2022-11-07 ENCOUNTER — ASOB RESULT (OUTPATIENT)
Age: 32
End: 2022-11-07

## 2022-11-07 ENCOUNTER — APPOINTMENT (OUTPATIENT)
Dept: ANTEPARTUM | Facility: CLINIC | Age: 32
End: 2022-11-07

## 2022-11-07 PROCEDURE — 76816 OB US FOLLOW-UP PER FETUS: CPT

## 2022-11-07 PROCEDURE — 76819 FETAL BIOPHYS PROFIL W/O NST: CPT | Mod: 59

## 2022-11-14 ENCOUNTER — NON-APPOINTMENT (OUTPATIENT)
Age: 32
End: 2022-11-14

## 2022-11-15 ENCOUNTER — APPOINTMENT (OUTPATIENT)
Dept: OBGYN | Facility: CLINIC | Age: 32
End: 2022-11-15

## 2022-11-15 VITALS
DIASTOLIC BLOOD PRESSURE: 70 MMHG | OXYGEN SATURATION: 98 % | BODY MASS INDEX: 33.12 KG/M2 | SYSTOLIC BLOOD PRESSURE: 120 MMHG | WEIGHT: 199 LBS

## 2022-11-15 PROCEDURE — 99213 OFFICE O/P EST LOW 20 MIN: CPT | Mod: TH

## 2022-11-15 RX ORDER — .BETA.-CAROTENE, ASCORBIC ACID, CHOLECALCIFEROL, .ALPHA.-TOCOPHEROL ACETATE, DL-, THIAMINE MONONITRATE, RIBOFLAVIN, NIACINAMIDE, PYRIDOXINE HYDROCHLORIDE, FOLIC ACID, CYANOCOBALAMIN, CALCIUM PANTOTHENATE, CALCIUM CARBONATE, FERROUS FUMARATE, ZINC OXIDE, AND DOCUSATE SODIUM 1000; 100; 400; 30; 3; 3; 15; 20; 1; 12; 7; 200; 29; 20; 25 [IU]/1; MG/1; [IU]/1; [IU]/1; MG/1; MG/1; MG/1; MG/1; MG/1; UG/1; MG/1; MG/1; MG/1; MG/1; MG/1
29-1 TABLET, COATED ORAL DAILY
Qty: 90 | Refills: 3 | Status: ACTIVE | COMMUNITY
Start: 2020-11-04 | End: 1900-01-01

## 2022-11-29 ENCOUNTER — APPOINTMENT (OUTPATIENT)
Dept: OBGYN | Facility: CLINIC | Age: 32
End: 2022-11-29

## 2022-11-29 ENCOUNTER — NON-APPOINTMENT (OUTPATIENT)
Age: 32
End: 2022-11-29

## 2022-11-29 ENCOUNTER — EMERGENCY (EMERGENCY)
Facility: HOSPITAL | Age: 32
LOS: 1 days | Discharge: HOME | End: 2022-11-29
Attending: STUDENT IN AN ORGANIZED HEALTH CARE EDUCATION/TRAINING PROGRAM | Admitting: STUDENT IN AN ORGANIZED HEALTH CARE EDUCATION/TRAINING PROGRAM
Payer: MEDICAID

## 2022-11-29 VITALS
SYSTOLIC BLOOD PRESSURE: 118 MMHG | HEART RATE: 111 BPM | RESPIRATION RATE: 17 BRPM | DIASTOLIC BLOOD PRESSURE: 63 MMHG | OXYGEN SATURATION: 97 %

## 2022-11-29 VITALS
TEMPERATURE: 98 F | HEART RATE: 127 BPM | WEIGHT: 199.96 LBS | OXYGEN SATURATION: 98 % | SYSTOLIC BLOOD PRESSURE: 120 MMHG | HEIGHT: 65 IN | RESPIRATION RATE: 18 BRPM | DIASTOLIC BLOOD PRESSURE: 70 MMHG

## 2022-11-29 DIAGNOSIS — O26.893 OTHER SPECIFIED PREGNANCY RELATED CONDITIONS, THIRD TRIMESTER: ICD-10-CM

## 2022-11-29 DIAGNOSIS — Z88.2 ALLERGY STATUS TO SULFONAMIDES: ICD-10-CM

## 2022-11-29 DIAGNOSIS — O98.513 OTHER VIRAL DISEASES COMPLICATING PREGNANCY, THIRD TRIMESTER: ICD-10-CM

## 2022-11-29 DIAGNOSIS — O99.891 OTHER SPECIFIED DISEASES AND CONDITIONS COMPLICATING PREGNANCY: ICD-10-CM

## 2022-11-29 DIAGNOSIS — Z20.822 CONTACT WITH AND (SUSPECTED) EXPOSURE TO COVID-19: ICD-10-CM

## 2022-11-29 DIAGNOSIS — R00.0 TACHYCARDIA, UNSPECIFIED: ICD-10-CM

## 2022-11-29 DIAGNOSIS — Z3A.35 35 WEEKS GESTATION OF PREGNANCY: ICD-10-CM

## 2022-11-29 DIAGNOSIS — Z91.018 ALLERGY TO OTHER FOODS: ICD-10-CM

## 2022-11-29 DIAGNOSIS — B34.9 VIRAL INFECTION, UNSPECIFIED: ICD-10-CM

## 2022-11-29 LAB
FLUAV AG NPH QL: DETECTED — SIGNIFICANT CHANGE UP
FLUBV AG NPH QL: SIGNIFICANT CHANGE UP
RSV RNA NPH QL NAA+NON-PROBE: SIGNIFICANT CHANGE UP
SARS-COV-2 RNA SPEC QL NAA+PROBE: SIGNIFICANT CHANGE UP

## 2022-11-29 PROCEDURE — 87637 SARSCOV2&INF A&B&RSV AMP PRB: CPT

## 2022-11-29 PROCEDURE — 99284 EMERGENCY DEPT VISIT MOD MDM: CPT

## 2022-11-29 PROCEDURE — 99283 EMERGENCY DEPT VISIT LOW MDM: CPT

## 2022-11-29 NOTE — ED PROVIDER NOTE - PATIENT PORTAL LINK FT
You can access the FollowMyHealth Patient Portal offered by [facility] by registering at the following website: [email or web address]. By joining [email or web address] [email or web address] portal, you will also be able to view your health information using other applications (apps) compatible with our system.

## 2022-11-29 NOTE — ED PROVIDER NOTE - OBJECTIVE STATEMENT
32yF  here w/ 1 day of low grade fever, congestion, cough. Daughter with same symptoms and went to another ED, was diagnosed w/ URI. Pt with no chest pain, SOB. Says she came in to check on baby. No vaginal bleeding, no abd pain or cramping. +feels Fetal movement. Says pregnancy has been uncomplicated so far.

## 2022-11-29 NOTE — ED ADULT TRIAGE NOTE - CHIEF COMPLAINT QUOTE
currently 35wks pregnant presents co flu like sx x2 days: fevers, chills, bod yaches, rhinorrhea. TMax 100.3F. Took tylenols PTA. Denies abdominal pain, pelvic pain, vaginal bleeding.

## 2022-11-29 NOTE — ED PROVIDER NOTE - PHYSICAL EXAMINATION
CONST: nontoxic NAD speaking in full sentences  HEAD: atraumatic  EYES: conjunctivae clear, PERRL, EOMI  ENT: mmm  NECK: supple/FROM, nttp, no jvd  CARD: rrr  CHEST: ctab no r/r/w, no stridor/retractions/tripoding  ABD: gravid abdomen  EXT: FROM, symmetric distal pulses intact  SKIN: warm, dry, no rash, no pedal edema/ttp/rash, cap refill <2sec  NEURO: a+ox3, 5/5 strength x4, gross sensation intact x4, baseline gait

## 2022-11-29 NOTE — ED PROVIDER NOTE - CLINICAL SUMMARY MEDICAL DECISION MAKING FREE TEXT BOX
Patient well appearing, history consistent w/ viral syndrome  tachycardia noted, pt was anxious, no SOB hypoxia or any other signs suggestive of PE or occult dangerous pathology  POCUS done shows term fetus, +Fetal movement, +FHR 136bpm, head down to the pelvis  plan for viral swab, recheck HR and dc

## 2022-12-05 ENCOUNTER — NON-APPOINTMENT (OUTPATIENT)
Age: 32
End: 2022-12-05

## 2022-12-05 ENCOUNTER — APPOINTMENT (OUTPATIENT)
Dept: OBGYN | Facility: CLINIC | Age: 32
End: 2022-12-05

## 2022-12-05 ENCOUNTER — APPOINTMENT (OUTPATIENT)
Dept: ANTEPARTUM | Facility: CLINIC | Age: 32
End: 2022-12-05

## 2022-12-05 VITALS
OXYGEN SATURATION: 99 % | SYSTOLIC BLOOD PRESSURE: 110 MMHG | DIASTOLIC BLOOD PRESSURE: 70 MMHG | BODY MASS INDEX: 33.15 KG/M2 | WEIGHT: 199 LBS | HEIGHT: 65 IN

## 2022-12-05 DIAGNOSIS — Z11.3 ENCOUNTER FOR SCREENING FOR INFECTIONS WITH A PREDOMINANTLY SEXUAL MODE OF TRANSMISSION: ICD-10-CM

## 2022-12-05 PROCEDURE — 99213 OFFICE O/P EST LOW 20 MIN: CPT | Mod: TH

## 2022-12-06 ENCOUNTER — NON-APPOINTMENT (OUTPATIENT)
Age: 32
End: 2022-12-06

## 2022-12-07 LAB
C TRACH RRNA SPEC QL NAA+PROBE: NOT DETECTED
HCV AB SER QL: NONREACTIVE
HCV S/CO RATIO: 0.09 S/CO
HIV1+2 AB SPEC QL IA.RAPID: NONREACTIVE
N GONORRHOEA RRNA SPEC QL NAA+PROBE: NOT DETECTED
SOURCE AMPLIFICATION: NORMAL
T PALLIDUM AB SER QL IA: NEGATIVE

## 2022-12-08 ENCOUNTER — NON-APPOINTMENT (OUTPATIENT)
Age: 32
End: 2022-12-08

## 2022-12-09 LAB — B-HEM STREP SPEC QL CULT: NORMAL

## 2022-12-13 ENCOUNTER — NON-APPOINTMENT (OUTPATIENT)
Age: 32
End: 2022-12-13

## 2022-12-13 ENCOUNTER — ASOB RESULT (OUTPATIENT)
Age: 32
End: 2022-12-13

## 2022-12-13 ENCOUNTER — APPOINTMENT (OUTPATIENT)
Dept: OBGYN | Facility: CLINIC | Age: 32
End: 2022-12-13

## 2022-12-13 ENCOUNTER — APPOINTMENT (OUTPATIENT)
Dept: ANTEPARTUM | Facility: CLINIC | Age: 32
End: 2022-12-13

## 2022-12-13 VITALS
SYSTOLIC BLOOD PRESSURE: 120 MMHG | BODY MASS INDEX: 33.78 KG/M2 | WEIGHT: 203 LBS | OXYGEN SATURATION: 98 % | DIASTOLIC BLOOD PRESSURE: 70 MMHG

## 2022-12-13 PROCEDURE — 76816 OB US FOLLOW-UP PER FETUS: CPT

## 2022-12-13 PROCEDURE — 99213 OFFICE O/P EST LOW 20 MIN: CPT | Mod: TH

## 2022-12-13 PROCEDURE — 76819 FETAL BIOPHYS PROFIL W/O NST: CPT

## 2022-12-19 ENCOUNTER — APPOINTMENT (OUTPATIENT)
Dept: OBGYN | Facility: CLINIC | Age: 32
End: 2022-12-19

## 2022-12-19 VITALS
HEIGHT: 65 IN | BODY MASS INDEX: 33.99 KG/M2 | DIASTOLIC BLOOD PRESSURE: 62 MMHG | OXYGEN SATURATION: 97 % | SYSTOLIC BLOOD PRESSURE: 120 MMHG | WEIGHT: 204 LBS

## 2022-12-19 LAB
APPEARANCE: CLEAR
BILIRUBIN URINE: NEGATIVE
BLOOD URINE: NEGATIVE
COLOR: NORMAL
GLUCOSE QUALITATIVE U: NEGATIVE
KETONES URINE: NEGATIVE
LEUKOCYTE ESTERASE URINE: NEGATIVE
NITRITE URINE: NEGATIVE
PH URINE: 7
PROTEIN URINE: NEGATIVE
SPECIFIC GRAVITY URINE: 1.01
UROBILINOGEN URINE: NORMAL

## 2022-12-19 PROCEDURE — 99213 OFFICE O/P EST LOW 20 MIN: CPT | Mod: TH,GC

## 2022-12-27 ENCOUNTER — APPOINTMENT (OUTPATIENT)
Dept: OBGYN | Facility: CLINIC | Age: 32
End: 2022-12-27

## 2022-12-27 ENCOUNTER — NON-APPOINTMENT (OUTPATIENT)
Age: 32
End: 2022-12-27

## 2022-12-27 VITALS
DIASTOLIC BLOOD PRESSURE: 70 MMHG | OXYGEN SATURATION: 97 % | BODY MASS INDEX: 34.28 KG/M2 | WEIGHT: 206 LBS | SYSTOLIC BLOOD PRESSURE: 120 MMHG

## 2022-12-27 PROCEDURE — 99213 OFFICE O/P EST LOW 20 MIN: CPT | Mod: TH

## 2023-01-02 LAB — SARS-COV-2 N GENE NPH QL NAA+PROBE: NOT DETECTED

## 2023-01-03 ENCOUNTER — ASOB RESULT (OUTPATIENT)
Age: 33
End: 2023-01-03

## 2023-01-03 ENCOUNTER — APPOINTMENT (OUTPATIENT)
Dept: ANTEPARTUM | Facility: CLINIC | Age: 33
End: 2023-01-03
Payer: MEDICAID

## 2023-01-03 ENCOUNTER — APPOINTMENT (OUTPATIENT)
Dept: OBGYN | Facility: CLINIC | Age: 33
End: 2023-01-03
Payer: MEDICAID

## 2023-01-03 ENCOUNTER — NON-APPOINTMENT (OUTPATIENT)
Age: 33
End: 2023-01-03

## 2023-01-03 VITALS
BODY MASS INDEX: 34.45 KG/M2 | DIASTOLIC BLOOD PRESSURE: 70 MMHG | WEIGHT: 207 LBS | OXYGEN SATURATION: 97 % | SYSTOLIC BLOOD PRESSURE: 120 MMHG

## 2023-01-03 PROCEDURE — 99213 OFFICE O/P EST LOW 20 MIN: CPT | Mod: TH

## 2023-01-03 PROCEDURE — 76816 OB US FOLLOW-UP PER FETUS: CPT

## 2023-01-03 PROCEDURE — 76819 FETAL BIOPHYS PROFIL W/O NST: CPT

## 2023-01-04 ENCOUNTER — INPATIENT (INPATIENT)
Facility: HOSPITAL | Age: 33
LOS: 2 days | Discharge: HOME | End: 2023-01-07
Attending: OBSTETRICS & GYNECOLOGY | Admitting: OBSTETRICS & GYNECOLOGY
Payer: MEDICAID

## 2023-01-04 VITALS — WEIGHT: 205.03 LBS | HEIGHT: 65 IN

## 2023-01-04 LAB
BASOPHILS # BLD AUTO: 0.03 K/UL — SIGNIFICANT CHANGE UP (ref 0–0.2)
BASOPHILS NFR BLD AUTO: 0.3 % — SIGNIFICANT CHANGE UP (ref 0–2)
BLD GP AB SCN SERPL QL: NEGATIVE — SIGNIFICANT CHANGE UP
EOSINOPHIL # BLD AUTO: 0.26 K/UL — SIGNIFICANT CHANGE UP (ref 0–0.5)
EOSINOPHIL NFR BLD AUTO: 2.4 % — SIGNIFICANT CHANGE UP (ref 0–6)
HCT VFR BLD CALC: 37.6 % — SIGNIFICANT CHANGE UP (ref 34.5–45)
HGB BLD-MCNC: 12.8 G/DL — SIGNIFICANT CHANGE UP (ref 11.5–15.5)
IMM GRANULOCYTES NFR BLD AUTO: 0.5 % — SIGNIFICANT CHANGE UP (ref 0–0.9)
LYMPHOCYTES # BLD AUTO: 2.32 K/UL — SIGNIFICANT CHANGE UP (ref 1–3.3)
LYMPHOCYTES # BLD AUTO: 21.4 % — SIGNIFICANT CHANGE UP (ref 13–44)
MCHC RBC-ENTMCNC: 27.4 PG — SIGNIFICANT CHANGE UP (ref 27–34)
MCHC RBC-ENTMCNC: 34 GM/DL — SIGNIFICANT CHANGE UP (ref 32–36)
MCV RBC AUTO: 80.3 FL — SIGNIFICANT CHANGE UP (ref 80–100)
MONOCYTES # BLD AUTO: 0.84 K/UL — SIGNIFICANT CHANGE UP (ref 0–0.9)
MONOCYTES NFR BLD AUTO: 7.8 % — SIGNIFICANT CHANGE UP (ref 2–14)
NEUTROPHILS # BLD AUTO: 7.33 K/UL — SIGNIFICANT CHANGE UP (ref 1.8–7.4)
NEUTROPHILS NFR BLD AUTO: 67.6 % — SIGNIFICANT CHANGE UP (ref 43–77)
NRBC # BLD: 0 /100 WBCS — SIGNIFICANT CHANGE UP (ref 0–0)
PLATELET # BLD AUTO: 182 K/UL — SIGNIFICANT CHANGE UP (ref 150–400)
RBC # BLD: 4.68 M/UL — SIGNIFICANT CHANGE UP (ref 3.8–5.2)
RBC # FLD: 13.8 % — SIGNIFICANT CHANGE UP (ref 10.3–14.5)
RH IG SCN BLD-IMP: POSITIVE — SIGNIFICANT CHANGE UP
WBC # BLD: 10.83 K/UL — HIGH (ref 3.8–10.5)
WBC # FLD AUTO: 10.83 K/UL — HIGH (ref 3.8–10.5)

## 2023-01-04 RX ORDER — CITRIC ACID/SODIUM CITRATE 300-500 MG
15 SOLUTION, ORAL ORAL EVERY 6 HOURS
Refills: 0 | Status: DISCONTINUED | OUTPATIENT
Start: 2023-01-04 | End: 2023-01-05

## 2023-01-04 RX ORDER — OXYTOCIN 10 UNIT/ML
333.33 VIAL (ML) INJECTION
Qty: 20 | Refills: 0 | Status: DISCONTINUED | OUTPATIENT
Start: 2023-01-04 | End: 2023-01-05

## 2023-01-04 RX ORDER — OXYTOCIN 10 UNIT/ML
2 VIAL (ML) INJECTION
Qty: 30 | Refills: 0 | Status: DISCONTINUED | OUTPATIENT
Start: 2023-01-04 | End: 2023-01-07

## 2023-01-04 RX ORDER — CHLORHEXIDINE GLUCONATE 213 G/1000ML
1 SOLUTION TOPICAL ONCE
Refills: 0 | Status: DISCONTINUED | OUTPATIENT
Start: 2023-01-04 | End: 2023-01-05

## 2023-01-04 RX ORDER — SODIUM CHLORIDE 9 MG/ML
1000 INJECTION, SOLUTION INTRAVENOUS
Refills: 0 | Status: DISCONTINUED | OUTPATIENT
Start: 2023-01-04 | End: 2023-01-05

## 2023-01-04 RX ADMIN — Medication 2 MILLIUNIT(S)/MIN: at 18:34

## 2023-01-04 NOTE — PATIENT PROFILE OB - PATIENT REPRESENTATIVE PHONE
[phone] Xolair Counseling:  Patient informed of potential adverse effects including but not limited to fever, muscle aches, rash and allergic reactions.  The patient verbalized understanding of the proper use and possible adverse effects of Xolair.  All of the patient's questions and concerns were addressed.

## 2023-01-05 LAB
COVID-19 SPIKE DOMAIN AB INTERP: POSITIVE
COVID-19 SPIKE DOMAIN ANTIBODY RESULT: >250 U/ML — HIGH
SARS-COV-2 IGG+IGM SERPL QL IA: >250 U/ML — HIGH
SARS-COV-2 IGG+IGM SERPL QL IA: POSITIVE
T PALLIDUM AB TITR SER: NEGATIVE — SIGNIFICANT CHANGE UP

## 2023-01-05 PROCEDURE — 59409 OBSTETRICAL CARE: CPT | Mod: U9

## 2023-01-05 RX ORDER — SODIUM CHLORIDE 9 MG/ML
3 INJECTION INTRAMUSCULAR; INTRAVENOUS; SUBCUTANEOUS EVERY 8 HOURS
Refills: 0 | Status: DISCONTINUED | OUTPATIENT
Start: 2023-01-05 | End: 2023-01-06

## 2023-01-05 RX ORDER — IBUPROFEN 200 MG
600 TABLET ORAL EVERY 6 HOURS
Refills: 0 | Status: COMPLETED | OUTPATIENT
Start: 2023-01-05 | End: 2023-12-04

## 2023-01-05 RX ORDER — TETANUS TOXOID, REDUCED DIPHTHERIA TOXOID AND ACELLULAR PERTUSSIS VACCINE, ADSORBED 5; 2.5; 8; 8; 2.5 [IU]/.5ML; [IU]/.5ML; UG/.5ML; UG/.5ML; UG/.5ML
0.5 SUSPENSION INTRAMUSCULAR ONCE
Refills: 0 | Status: DISCONTINUED | OUTPATIENT
Start: 2023-01-05 | End: 2023-01-07

## 2023-01-05 RX ORDER — OXYCODONE HYDROCHLORIDE 5 MG/1
5 TABLET ORAL
Refills: 0 | Status: DISCONTINUED | OUTPATIENT
Start: 2023-01-05 | End: 2023-01-07

## 2023-01-05 RX ORDER — AER TRAVELER 0.5 G/1
1 SOLUTION RECTAL; TOPICAL EVERY 4 HOURS
Refills: 0 | Status: DISCONTINUED | OUTPATIENT
Start: 2023-01-05 | End: 2023-01-07

## 2023-01-05 RX ORDER — OXYCODONE HYDROCHLORIDE 5 MG/1
5 TABLET ORAL ONCE
Refills: 0 | Status: DISCONTINUED | OUTPATIENT
Start: 2023-01-05 | End: 2023-01-07

## 2023-01-05 RX ORDER — SIMETHICONE 80 MG/1
80 TABLET, CHEWABLE ORAL EVERY 4 HOURS
Refills: 0 | Status: DISCONTINUED | OUTPATIENT
Start: 2023-01-05 | End: 2023-01-07

## 2023-01-05 RX ORDER — FENTANYL/BUPIVACAINE/NS/PF 2MCG/ML-.1
250 PLASTIC BAG, INJECTION (ML) INJECTION
Refills: 0 | Status: DISCONTINUED | OUTPATIENT
Start: 2023-01-05 | End: 2023-01-05

## 2023-01-05 RX ORDER — DIPHENHYDRAMINE HCL 50 MG
25 CAPSULE ORAL EVERY 6 HOURS
Refills: 0 | Status: DISCONTINUED | OUTPATIENT
Start: 2023-01-05 | End: 2023-01-07

## 2023-01-05 RX ORDER — MAGNESIUM HYDROXIDE 400 MG/1
30 TABLET, CHEWABLE ORAL
Refills: 0 | Status: DISCONTINUED | OUTPATIENT
Start: 2023-01-05 | End: 2023-01-07

## 2023-01-05 RX ORDER — PRAMOXINE HYDROCHLORIDE 150 MG/15G
1 AEROSOL, FOAM RECTAL EVERY 4 HOURS
Refills: 0 | Status: DISCONTINUED | OUTPATIENT
Start: 2023-01-05 | End: 2023-01-07

## 2023-01-05 RX ORDER — DIBUCAINE 1 %
1 OINTMENT (GRAM) RECTAL EVERY 6 HOURS
Refills: 0 | Status: DISCONTINUED | OUTPATIENT
Start: 2023-01-05 | End: 2023-01-07

## 2023-01-05 RX ORDER — LANOLIN
1 OINTMENT (GRAM) TOPICAL EVERY 6 HOURS
Refills: 0 | Status: DISCONTINUED | OUTPATIENT
Start: 2023-01-05 | End: 2023-01-07

## 2023-01-05 RX ORDER — BENZOCAINE 10 %
1 GEL (GRAM) MUCOUS MEMBRANE EVERY 6 HOURS
Refills: 0 | Status: DISCONTINUED | OUTPATIENT
Start: 2023-01-05 | End: 2023-01-07

## 2023-01-05 RX ORDER — OXYTOCIN 10 UNIT/ML
41.67 VIAL (ML) INJECTION
Qty: 20 | Refills: 0 | Status: DISCONTINUED | OUTPATIENT
Start: 2023-01-05 | End: 2023-01-07

## 2023-01-05 RX ORDER — HYDROCORTISONE 1 %
1 OINTMENT (GRAM) TOPICAL EVERY 6 HOURS
Refills: 0 | Status: DISCONTINUED | OUTPATIENT
Start: 2023-01-05 | End: 2023-01-07

## 2023-01-05 RX ORDER — ONDANSETRON 8 MG/1
8 TABLET, FILM COATED ORAL ONCE
Refills: 0 | Status: COMPLETED | OUTPATIENT
Start: 2023-01-05 | End: 2023-01-05

## 2023-01-05 RX ORDER — IBUPROFEN 200 MG
600 TABLET ORAL EVERY 6 HOURS
Refills: 0 | Status: DISCONTINUED | OUTPATIENT
Start: 2023-01-05 | End: 2023-01-07

## 2023-01-05 RX ORDER — KETOROLAC TROMETHAMINE 30 MG/ML
30 SYRINGE (ML) INJECTION ONCE
Refills: 0 | Status: DISCONTINUED | OUTPATIENT
Start: 2023-01-05 | End: 2023-01-05

## 2023-01-05 RX ORDER — ACETAMINOPHEN 500 MG
975 TABLET ORAL
Refills: 0 | Status: DISCONTINUED | OUTPATIENT
Start: 2023-01-05 | End: 2023-01-07

## 2023-01-05 RX ADMIN — Medication 1 SPRAY(S): at 21:18

## 2023-01-05 RX ADMIN — Medication 975 MILLIGRAM(S): at 21:16

## 2023-01-05 RX ADMIN — Medication 30 MILLIGRAM(S): at 07:00

## 2023-01-05 RX ADMIN — Medication 1000 MILLIUNIT(S)/MIN: at 05:13

## 2023-01-05 RX ADMIN — SODIUM CHLORIDE 3 MILLILITER(S): 9 INJECTION INTRAMUSCULAR; INTRAVENOUS; SUBCUTANEOUS at 14:00

## 2023-01-05 RX ADMIN — Medication 600 MILLIGRAM(S): at 18:00

## 2023-01-05 RX ADMIN — Medication 600 MILLIGRAM(S): at 13:00

## 2023-01-05 RX ADMIN — Medication 30 MILLIGRAM(S): at 06:30

## 2023-01-05 RX ADMIN — Medication 975 MILLIGRAM(S): at 09:31

## 2023-01-05 RX ADMIN — Medication 600 MILLIGRAM(S): at 12:45

## 2023-01-05 RX ADMIN — Medication 1 TABLET(S): at 13:06

## 2023-01-05 RX ADMIN — ONDANSETRON 8 MILLIGRAM(S): 8 TABLET, FILM COATED ORAL at 00:50

## 2023-01-05 RX ADMIN — Medication 975 MILLIGRAM(S): at 10:00

## 2023-01-05 NOTE — LACTATION INITIAL EVALUATION - NS LACT CON REASON FOR REQ
40.1 wk gestation baby, about 7.5 hrs old at this time. Placed the baby STS with the mother while I provided breastfeeding education and explained normal  behaviour and the milk production feedback system. Assisted with positioning in a cross cradle hold and taught latch strategies. Baby was able to latch deeply and is feeding well, rhythmically sucking between short pauses of rest. Mother to continue with STS when possible, room-in, and feed as per cues at least 8-12x/ day. To f/u as needed./multiparous mom/staff request

## 2023-01-06 ENCOUNTER — TRANSCRIPTION ENCOUNTER (OUTPATIENT)
Age: 33
End: 2023-01-06

## 2023-01-06 RX ORDER — ACETAMINOPHEN 500 MG
3 TABLET ORAL
Qty: 0 | Refills: 0 | DISCHARGE
Start: 2023-01-06

## 2023-01-06 RX ORDER — IBUPROFEN 200 MG
1 TABLET ORAL
Qty: 0 | Refills: 0 | DISCHARGE
Start: 2023-01-06

## 2023-01-06 RX ADMIN — Medication 600 MILLIGRAM(S): at 19:00

## 2023-01-06 RX ADMIN — Medication 975 MILLIGRAM(S): at 15:31

## 2023-01-06 RX ADMIN — Medication 975 MILLIGRAM(S): at 20:44

## 2023-01-06 RX ADMIN — Medication 600 MILLIGRAM(S): at 13:11

## 2023-01-06 RX ADMIN — Medication 975 MILLIGRAM(S): at 09:29

## 2023-01-06 RX ADMIN — Medication 1 TABLET(S): at 13:11

## 2023-01-06 RX ADMIN — Medication 600 MILLIGRAM(S): at 13:00

## 2023-01-06 RX ADMIN — Medication 975 MILLIGRAM(S): at 21:30

## 2023-01-06 RX ADMIN — Medication 600 MILLIGRAM(S): at 05:25

## 2023-01-06 RX ADMIN — Medication 975 MILLIGRAM(S): at 16:00

## 2023-01-06 RX ADMIN — Medication 600 MILLIGRAM(S): at 18:58

## 2023-01-06 RX ADMIN — Medication 1 APPLICATION(S): at 09:30

## 2023-01-06 RX ADMIN — Medication 975 MILLIGRAM(S): at 10:00

## 2023-01-06 RX ADMIN — Medication 600 MILLIGRAM(S): at 06:15

## 2023-01-06 RX ADMIN — Medication 975 MILLIGRAM(S): at 00:58

## 2023-01-06 NOTE — DISCHARGE NOTE OB - NSCORESITESY/N_GEN_A_CORE_RD
"Chief Complaint   Patient presents with     Sinus Problem     Panel Management     height, flu, dap, phq9       Initial /80 mmHg  Pulse 80  Temp(Src) 99  F (37.2  C) (Temporal)  Resp 16  Ht 5' 2.48\" (1.587 m)  Wt 150 lb (68.04 kg)  BMI 27.02 kg/m2  SpO2 97% Estimated body mass index is 27.02 kg/(m^2) as calculated from the following:    Height as of this encounter: 5' 2.48\" (1.587 m).    Weight as of this encounter: 150 lb (68.04 kg).  BP completed using cuff size: regular  Alicja Grove CMA    " No

## 2023-01-06 NOTE — DISCHARGE NOTE OB - CARE PROVIDER_API CALL
[facility],   [address]  Phone: [phone]  Fax: (   )    -  Follow Up Time:

## 2023-01-06 NOTE — DISCHARGE NOTE OB - HOSPITAL COURSE
Patient is status post a vaginal delivery after elective term IOL. She had an uncomplicated postpartum course and has met her postpartum milestones appropriately.  Vitals are stable for discharge.

## 2023-01-06 NOTE — DISCHARGE NOTE OB - PROVIDER TOKENS
FREE:[LAST:[[facility]],PHONE:[phone]],FAX:[(   )    -],ADDRESS:[[address] [place] [address] street]]

## 2023-01-06 NOTE — DISCHARGE NOTE OB - NS MD DC FALL RISK RISK
For information on Fall & Injury Prevention, visit: [email or web address] OR  [email or web address] OR  https://www.cdc.gov/steadi/patient.html

## 2023-01-06 NOTE — DISCHARGE NOTE OB - PATIENT PORTAL LINK FT
You can access the FollowMyHealth Patient Portal offered by [facility] by registering at the following website: [email or web address]. By joining [employer]’s FollowMyHealth portal, you will also be able to view your health information using other applications (apps) compatible with our system.

## 2023-01-06 NOTE — DISCHARGE NOTE OB - NSDCCONDITION_GEN_ALL_CORE
"Anesthesia Transfer of Care Note    Patient: Elpidio Haskins    Procedure(s) Performed: Procedure(s) (LRB):  CLOSURE, WOUND (Right)  DEBRIDEMENT, LOWER EXTREMITY (Right)    Patient location: PACU    Anesthesia Type: general    Transport from OR: Transported from OR on 2-3 L/min O2 by NC with adequate spontaneous ventilation    Post pain: adequate analgesia    Post assessment: no apparent anesthetic complications    Post vital signs: stable    Level of consciousness: awake, alert and oriented    Nausea/Vomiting: no nausea/vomiting    Complications: none    Transfer of care protocol was followed      Last vitals:   Visit Vitals  /69 (BP Location: Right arm, Patient Position: Lying)   Pulse 78   Temp 36.6 °C (97.8 °F) (Oral)   Resp 16   Ht 5' 6" (1.676 m)   Wt 81.6 kg (180 lb)   SpO2 100%   BMI 29.05 kg/m²     " Stable

## 2023-01-07 VITALS
RESPIRATION RATE: 18 BRPM | TEMPERATURE: 99 F | DIASTOLIC BLOOD PRESSURE: 83 MMHG | HEART RATE: 70 BPM | OXYGEN SATURATION: 96 % | SYSTOLIC BLOOD PRESSURE: 134 MMHG

## 2023-01-07 PROCEDURE — 86901 BLOOD TYPING SEROLOGIC RH(D): CPT

## 2023-01-07 PROCEDURE — 86769 SARS-COV-2 COVID-19 ANTIBODY: CPT

## 2023-01-07 PROCEDURE — 36415 COLL VENOUS BLD VENIPUNCTURE: CPT

## 2023-01-07 PROCEDURE — 86900 BLOOD TYPING SEROLOGIC ABO: CPT

## 2023-01-07 PROCEDURE — 86850 RBC ANTIBODY SCREEN: CPT

## 2023-01-07 PROCEDURE — 86780 TREPONEMA PALLIDUM: CPT

## 2023-01-07 PROCEDURE — 59050 FETAL MONITOR W/REPORT: CPT

## 2023-01-07 PROCEDURE — 85025 COMPLETE CBC W/AUTO DIFF WBC: CPT

## 2023-01-07 RX ADMIN — Medication 600 MILLIGRAM(S): at 01:24

## 2023-01-07 RX ADMIN — Medication 600 MILLIGRAM(S): at 00:24

## 2023-01-07 RX ADMIN — Medication 600 MILLIGRAM(S): at 06:50

## 2023-01-07 RX ADMIN — Medication 600 MILLIGRAM(S): at 12:00

## 2023-01-07 RX ADMIN — Medication 975 MILLIGRAM(S): at 03:31

## 2023-01-07 RX ADMIN — Medication 1 TABLET(S): at 12:00

## 2023-01-07 RX ADMIN — Medication 975 MILLIGRAM(S): at 04:31

## 2023-01-07 RX ADMIN — Medication 600 MILLIGRAM(S): at 17:31

## 2023-01-07 RX ADMIN — Medication 975 MILLIGRAM(S): at 09:07

## 2023-01-07 RX ADMIN — Medication 600 MILLIGRAM(S): at 06:09

## 2023-01-07 RX ADMIN — Medication 975 MILLIGRAM(S): at 15:09

## 2023-01-07 NOTE — PROGRESS NOTE ADULT - SUBJECTIVE AND OBJECTIVE BOX
Patient evaluated at bedside this morning, resting comfortable in bed, no acute events overnight.  She reports pain is well controlled with tylenol and motrin.  She denies headache, dizziness, chest pain, palpitations, shortness of breath, nausea, vomiting, heavy vaginal bleeding or perineal discomfort. Reports decrease in amount of vaginal bleeding and denies clots.  She has been ambulating without assistance, voiding spontaneously.  Tolerating food well, without nausea/vomit.      Physical Exam:  T(C): 37 (01-05-23 @ 22:00), Max: 37 (01-05-23 @ 22:00)  HR: 76 (01-05-23 @ 22:00) (76 - 86)  BP: 125/76 (01-05-23 @ 22:00) (118/75 - 125/76)  RR: 18 (01-05-23 @ 22:00) (18 - 18)  SpO2: 97% (01-05-23 @ 22:00) (97% - 97%)    GA: NAD, comfortable, conversational  Abd: soft, nontender, nondistended, no rebound or guarding, uterus firm.  Extremities: no swelling or calf tenderness  Perineum: lochia wnl                          12.8   10.83 )-----------( 182      ( 04 Jan 2023 18:08 )             37.6           acetaminophen     Tablet .. 975 milliGRAM(s) Oral <User Schedule>  benzocaine 20%/menthol 0.5% Spray 1 Spray(s) Topical every 6 hours PRN  dibucaine 1% Ointment 1 Application(s) Topical every 6 hours PRN  diphenhydrAMINE 25 milliGRAM(s) Oral every 6 hours PRN  diphtheria/tetanus/pertussis (acellular) Vaccine (Adacel) 0.5 milliLiter(s) IntraMuscular once  hydrocortisone 1% Cream 1 Application(s) Topical every 6 hours PRN  ibuprofen  Tablet. 600 milliGRAM(s) Oral every 6 hours  lanolin Ointment 1 Application(s) Topical every 6 hours PRN  magnesium hydroxide Suspension 30 milliLiter(s) Oral two times a day PRN  oxyCODONE    IR 5 milliGRAM(s) Oral every 3 hours PRN  oxyCODONE    IR 5 milliGRAM(s) Oral once PRN  oxytocin Infusion 41.667 milliUNIT(s)/Min IV Continuous <Continuous>  oxytocin Infusion. 2 milliUNIT(s)/Min IV Continuous <Continuous>  pramoxine 1%/zinc 5% Cream 1 Application(s) Topical every 4 hours PRN  prenatal multivitamin 1 Tablet(s) Oral daily  simethicone 80 milliGRAM(s) Chew every 4 hours PRN  witch hazel Pads 1 Application(s) Topical every 4 hours PRN  
Patient evaluated at bedside this morning, resting comfortable in bed, no acute events overnight.  She reports pain is well controlled with tylenol and motrin.  She denies headache, dizziness, chest pain, palpitations, shortness of breath, nausea, vomiting, heavy vaginal bleeding or perineal discomfort. Reports decrease in amount of vaginal bleeding and denies clots.  She has been ambulating without assistance, voiding spontaneously.  Tolerating food well, without nausea/vomit.      Physical Exam:  T(C): 36.7 (01-06-23 @ 22:00), Max: 36.9 (01-06-23 @ 18:00)  HR: 66 (01-06-23 @ 22:00) (66 - 77)  BP: 125/76 (01-06-23 @ 22:00) (124/76 - 125/76)  RR: 18 (01-06-23 @ 22:00) (18 - 18)  SpO2: 98% (01-06-23 @ 22:00) (98% - 98%)    GA: NAD, comfortable, conversational  Abd: soft, nontender, nondistended, no rebound or guarding, uterus firm.  Extremities: no swelling or calf tenderness  Perineum: lochia wnl            acetaminophen     Tablet .. 975 milliGRAM(s) Oral <User Schedule>  benzocaine 20%/menthol 0.5% Spray 1 Spray(s) Topical every 6 hours PRN  dibucaine 1% Ointment 1 Application(s) Topical every 6 hours PRN  diphenhydrAMINE 25 milliGRAM(s) Oral every 6 hours PRN  diphtheria/tetanus/pertussis (acellular) Vaccine (Adacel) 0.5 milliLiter(s) IntraMuscular once  hydrocortisone 1% Cream 1 Application(s) Topical every 6 hours PRN  ibuprofen  Tablet. 600 milliGRAM(s) Oral every 6 hours  lanolin Ointment 1 Application(s) Topical every 6 hours PRN  magnesium hydroxide Suspension 30 milliLiter(s) Oral two times a day PRN  oxyCODONE    IR 5 milliGRAM(s) Oral every 3 hours PRN  oxyCODONE    IR 5 milliGRAM(s) Oral once PRN  oxytocin Infusion 41.667 milliUNIT(s)/Min IV Continuous <Continuous>  oxytocin Infusion. 2 milliUNIT(s)/Min IV Continuous <Continuous>  pramoxine 1%/zinc 5% Cream 1 Application(s) Topical every 4 hours PRN  prenatal multivitamin 1 Tablet(s) Oral daily  simethicone 80 milliGRAM(s) Chew every 4 hours PRN  witch hazel Pads 1 Application(s) Topical every 4 hours PRN

## 2023-02-16 ENCOUNTER — APPOINTMENT (OUTPATIENT)
Dept: OBGYN | Facility: CLINIC | Age: 33
End: 2023-02-16
Payer: MEDICAID

## 2023-02-16 VITALS
WEIGHT: 185 LBS | DIASTOLIC BLOOD PRESSURE: 70 MMHG | SYSTOLIC BLOOD PRESSURE: 110 MMHG | BODY MASS INDEX: 30.82 KG/M2 | HEIGHT: 65 IN

## 2023-02-16 DIAGNOSIS — Z34.93 ENCOUNTER FOR SUPERVISION OF NORMAL PREGNANCY, UNSPECIFIED, THIRD TRIMESTER: ICD-10-CM

## 2023-02-16 DIAGNOSIS — Z34.90 ENCOUNTER FOR SUPERVISION OF NORMAL PREGNANCY, UNSPECIFIED, UNSPECIFIED TRIMESTER: ICD-10-CM

## 2023-02-16 DIAGNOSIS — Z34.92 ENCOUNTER FOR SUPERVISION OF NORMAL PREGNANCY, UNSPECIFIED, SECOND TRIMESTER: ICD-10-CM

## 2023-02-16 NOTE — HISTORY OF PRESENT ILLNESS
[Last Pap Date: ___] : Last Pap Date: [unfilled] [Delivery Date: ___] : on [unfilled] [] : delivered by vaginal delivery [Male] : Delivery History: baby boy [Breastfeeding] : currently nursing [Back to Normal] : is back to normal in size [___ wks] : is [unfilled] weeks in size [Normal] : the vagina was normal [Not Done] : Examination of breasts not done [Doing Well] : is doing well [No Sign of Infection] : is showing no signs of infection [Excellent Pain Control] : has excellent pain control [None] : None [Complications:___] : no complications [BF with Difficulty] : nursing without difficulty [Resumed Menses] : has not resumed her menses [Resumed [place]] : has not resumed intercourse [Intended Contraception] : the patient does not intended to use contraception postpartum [Hematoma] : no vaginal hematoma [Abscess Formation] : no vaginal abscess [Infected] : did not appear infected [Dehiscence] : was not dehisced [Cervix Sample Taken] : cervical sample not taken for a Pap smear [FreeTextEntry8] : 6 week post partum s/p NSVD1 1/5/23 with no complications [de-identified] : breastfeeding w/o difficulty, good mood, good support at home; mixes with formula, no menses yet but aware she can become pregnant anthony as not exclusively BF; Williams depression scale = 0 [de-identified] : RT normal activity, no restrictions. demonstrated Kegel exercises today and encouraged 10x/row TID. Also asked patient ot please read about her contraception options on [email or web address] as she hasn't thought about what BCM to use but has successfully spaced out her pregnancy (last 2016) with withdrawal method.